# Patient Record
Sex: FEMALE | Race: NATIVE HAWAIIAN OR OTHER PACIFIC ISLANDER | URBAN - METROPOLITAN AREA
[De-identification: names, ages, dates, MRNs, and addresses within clinical notes are randomized per-mention and may not be internally consistent; named-entity substitution may affect disease eponyms.]

---

## 2023-10-13 ENCOUNTER — APPOINTMENT (RX ONLY)
Dept: URBAN - METROPOLITAN AREA CLINIC 33 | Facility: CLINIC | Age: 68
Setting detail: DERMATOLOGY
End: 2023-10-13

## 2023-10-13 DIAGNOSIS — D22 MELANOCYTIC NEVI: ICD-10-CM

## 2023-10-13 DIAGNOSIS — L81.1 CHLOASMA: ICD-10-CM

## 2023-10-13 DIAGNOSIS — L82.1 OTHER SEBORRHEIC KERATOSIS: ICD-10-CM

## 2023-10-13 DIAGNOSIS — L20.89 OTHER ATOPIC DERMATITIS: ICD-10-CM

## 2023-10-13 DIAGNOSIS — D18.0 HEMANGIOMA: ICD-10-CM

## 2023-10-13 DIAGNOSIS — L81.4 OTHER MELANIN HYPERPIGMENTATION: ICD-10-CM

## 2023-10-13 PROBLEM — D18.01 HEMANGIOMA OF SKIN AND SUBCUTANEOUS TISSUE: Status: ACTIVE | Noted: 2023-10-13

## 2023-10-13 PROBLEM — D22.5 MELANOCYTIC NEVI OF TRUNK: Status: ACTIVE | Noted: 2023-10-13

## 2023-10-13 PROBLEM — D22.0 MELANOCYTIC NEVI OF LIP: Status: ACTIVE | Noted: 2023-10-13

## 2023-10-13 PROBLEM — D23.71 OTHER BENIGN NEOPLASM OF SKIN OF RIGHT LOWER LIMB, INCLUDING HIP: Status: ACTIVE | Noted: 2023-10-13

## 2023-10-13 PROCEDURE — ? SUNSCREEN RECOMMENDATIONS

## 2023-10-13 PROCEDURE — ? PRESCRIPTION

## 2023-10-13 PROCEDURE — ? COUNSELING

## 2023-10-13 PROCEDURE — ? TREATMENT REGIMEN

## 2023-10-13 PROCEDURE — 99203 OFFICE O/P NEW LOW 30 MIN: CPT

## 2023-10-13 PROCEDURE — ? PHOTO-DOCUMENTATION

## 2023-10-13 RX ORDER — PHARMACY COMPOUNDING ACCESSORY
EACH MISCELLANEOUS QHS
Qty: 30 | Refills: 3 | Status: ERX | COMMUNITY
Start: 2023-10-13

## 2023-10-13 RX ORDER — DESONIDE 0.5 MG/G
CREAM TOPICAL BID
Qty: 60 | Refills: 1 | Status: ERX | COMMUNITY
Start: 2023-10-13

## 2023-10-13 RX ADMIN — Medication: at 00:00

## 2023-10-13 RX ADMIN — DESONIDE: 0.5 CREAM TOPICAL at 00:00

## 2023-10-13 ASSESSMENT — LOCATION SIMPLE DESCRIPTION DERM
LOCATION SIMPLE: RIGHT CHEEK
LOCATION SIMPLE: ABDOMEN
LOCATION SIMPLE: RIGHT UPPER BACK
LOCATION SIMPLE: LEFT LIP
LOCATION SIMPLE: LEFT BUTTOCK
LOCATION SIMPLE: LEFT CHEEK
LOCATION SIMPLE: NECK

## 2023-10-13 ASSESSMENT — LOCATION DETAILED DESCRIPTION DERM
LOCATION DETAILED: LEFT UPPER CUTANEOUS LIP
LOCATION DETAILED: LEFT RIB CAGE
LOCATION DETAILED: RIGHT LATERAL ABDOMEN
LOCATION DETAILED: RIGHT CENTRAL LATERAL NECK
LOCATION DETAILED: RIGHT INFERIOR MEDIAL UPPER BACK
LOCATION DETAILED: LEFT CENTRAL MALAR CHEEK
LOCATION DETAILED: RIGHT SUPERIOR MEDIAL UPPER BACK
LOCATION DETAILED: RIGHT CENTRAL MALAR CHEEK
LOCATION DETAILED: RIGHT SUPERIOR UPPER BACK
LOCATION DETAILED: LEFT CENTRAL LATERAL NECK
LOCATION DETAILED: LEFT BUTTOCK

## 2023-10-13 ASSESSMENT — SEVERITY ASSESSMENT: SEVERITY: MILD, SLIGHTLY DARKER THAN THE SURROUNDING NORMAL SKIN

## 2023-10-13 ASSESSMENT — LOCATION ZONE DERM
LOCATION ZONE: LIP
LOCATION ZONE: FACE
LOCATION ZONE: NECK
LOCATION ZONE: TRUNK

## 2023-10-13 ASSESSMENT — SEVERITY ASSESSMENT 2020: SEVERITY 2020: MILD

## 2023-10-13 NOTE — PROCEDURE: TREATMENT REGIMEN
Detail Level: Zone
Initiate Treatment: desonide 0.05 % topical cream BID\\nApply twice daily to eczema on the neck, face and arms up to 2 weeks/month as needed.
Plan: Start topical steriod and reassess at follow up. \\n\\nRTC in 3 months.
Plan: Start topical steriod and reasses at follow up. \\n\\n\\nRTC IN 3 months
Initiate Treatment: pharmacy compounding accessory Qhs (tranexamic acid 4.8% / azelaic acid 15% / kojic acid 6% cream)\\nApply a thin layer to discoloration on the face every other night

## 2023-10-13 NOTE — HPI: EVALUATION OF SKIN LESION(S)
What Type Of Note Output Would You Prefer (Optional)?: Standard Output
Hpi Title: Evaluation of Skin Lesions
How Severe Are Your Spot(S)?: moderate
Have Your Spot(S) Been Treated In The Past?: has not been treated
Additional History: Patient is being seen today for a full body skin exam.  She is complaining of two spots located on the abdomen and buttocks.

## 2023-10-13 NOTE — HPI: RASH
What Type Of Note Output Would You Prefer (Optional)?: Standard Output
Is The Patient Presenting As Previously Scheduled?: Yes
How Severe Is Your Rash?: moderate
Is This A New Presentation, Or A Follow-Up?: Rash
Additional History: Patient is being seen today for a rash located on her neck. She states it is itchy and has been present for a week. She states the only thing she uses on the area is aveeno lotion.

## 2024-01-17 ENCOUNTER — APPOINTMENT (RX ONLY)
Dept: URBAN - METROPOLITAN AREA CLINIC 33 | Facility: CLINIC | Age: 69
Setting detail: DERMATOLOGY
End: 2024-01-17

## 2024-01-17 DIAGNOSIS — L81.1 CHLOASMA: ICD-10-CM

## 2024-01-17 PROCEDURE — ? SUNSCREEN RECOMMENDATIONS

## 2024-01-17 PROCEDURE — ? PHOTO-DOCUMENTATION

## 2024-01-17 PROCEDURE — ? COUNSELING

## 2024-01-17 PROCEDURE — ? PRESCRIPTION MEDICATION MANAGEMENT

## 2024-01-17 PROCEDURE — 99213 OFFICE O/P EST LOW 20 MIN: CPT

## 2024-01-17 PROCEDURE — ? PRESCRIPTION

## 2024-01-17 RX ORDER — HYDROQUINONE 40 MG/G
CREAM TOPICAL QD
Qty: 28.35 | Refills: 1 | Status: ERX | COMMUNITY
Start: 2024-01-17

## 2024-01-17 RX ADMIN — HYDROQUINONE: 40 CREAM TOPICAL at 00:00

## 2024-01-17 ASSESSMENT — LOCATION ZONE DERM: LOCATION ZONE: FACE

## 2024-01-17 ASSESSMENT — LOCATION DETAILED DESCRIPTION DERM: LOCATION DETAILED: RIGHT CENTRAL MALAR CHEEK

## 2024-01-17 ASSESSMENT — LOCATION SIMPLE DESCRIPTION DERM: LOCATION SIMPLE: RIGHT CHEEK

## 2024-01-17 NOTE — PROCEDURE: PRESCRIPTION MEDICATION MANAGEMENT
Plan: RTC 3 MONTHS
Detail Level: Zone
Initiate Treatment: hydroquinone 4 % topical cream Qd\\nQuantity: 28.35 g  Days Supply: 30\\nSig: Apply a thin layer to dark spots on face once nightly up to 3 months then discontinue.
Render In Strict Bullet Format?: No

## 2024-08-16 ENCOUNTER — APPOINTMENT (RX ONLY)
Dept: URBAN - METROPOLITAN AREA CLINIC 33 | Facility: CLINIC | Age: 69
Setting detail: DERMATOLOGY
End: 2024-08-16

## 2024-08-16 DIAGNOSIS — L82.1 OTHER SEBORRHEIC KERATOSIS: ICD-10-CM

## 2024-08-16 PROBLEM — D23.71 OTHER BENIGN NEOPLASM OF SKIN OF RIGHT LOWER LIMB, INCLUDING HIP: Status: ACTIVE | Noted: 2024-08-16

## 2024-08-16 PROCEDURE — 99212 OFFICE O/P EST SF 10 MIN: CPT

## 2024-08-16 PROCEDURE — ? COUNSELING

## 2024-08-16 ASSESSMENT — LOCATION ZONE DERM: LOCATION ZONE: LEG

## 2024-08-16 ASSESSMENT — LOCATION DETAILED DESCRIPTION DERM: LOCATION DETAILED: LEFT LATERAL DISTAL PRETIBIAL REGION

## 2024-08-16 ASSESSMENT — LOCATION SIMPLE DESCRIPTION DERM: LOCATION SIMPLE: LEFT PRETIBIAL REGION

## 2024-08-16 NOTE — HPI: SKIN LESIONS
How Severe Is Your Skin Lesion?: mild
Is This A New Presentation, Or A Follow-Up?: Skin Lesions
Additional History: Patient is her concerning lesions on right foot and left ankle. Lesion on right foot been present for years and lesion on left ankle has been present for 3 months. Patient states that lesions was tender and itchy for the first week but is  asymptomatic today.